# Patient Record
Sex: MALE | Race: BLACK OR AFRICAN AMERICAN | Employment: OTHER | URBAN - METROPOLITAN AREA
[De-identification: names, ages, dates, MRNs, and addresses within clinical notes are randomized per-mention and may not be internally consistent; named-entity substitution may affect disease eponyms.]

---

## 2020-02-22 ENCOUNTER — APPOINTMENT (OUTPATIENT)
Dept: CT IMAGING | Age: 35
End: 2020-02-22
Attending: EMERGENCY MEDICINE
Payer: SELF-PAY

## 2020-02-22 ENCOUNTER — HOSPITAL ENCOUNTER (EMERGENCY)
Age: 35
Discharge: HOME | End: 2020-02-22
Attending: EMERGENCY MEDICINE
Payer: SELF-PAY

## 2020-02-22 VITALS
WEIGHT: 184.08 LBS | RESPIRATION RATE: 16 BRPM | BODY MASS INDEX: 27.27 KG/M2 | TEMPERATURE: 98.4 F | OXYGEN SATURATION: 96 % | DIASTOLIC BLOOD PRESSURE: 54 MMHG | SYSTOLIC BLOOD PRESSURE: 91 MMHG | HEIGHT: 69 IN | HEART RATE: 54 BPM

## 2020-02-22 DIAGNOSIS — R10.31 RLQ ABDOMINAL PAIN: Primary | ICD-10-CM

## 2020-02-22 LAB
ALBUMIN SERPL-MCNC: 4 G/DL (ref 3.5–5)
ALBUMIN/GLOB SERPL: 1 {RATIO} (ref 1.1–2.2)
ALP SERPL-CCNC: 78 U/L (ref 45–117)
ALT SERPL-CCNC: 23 U/L (ref 12–78)
ANION GAP SERPL CALC-SCNC: 5 MMOL/L (ref 5–15)
APPEARANCE UR: CLEAR
AST SERPL-CCNC: 15 U/L (ref 15–37)
BILIRUB SERPL-MCNC: 1.1 MG/DL (ref 0.2–1)
BILIRUB UR QL: NEGATIVE
BUN SERPL-MCNC: 9 MG/DL (ref 6–20)
BUN/CREAT SERPL: 8 (ref 12–20)
CALCIUM SERPL-MCNC: 8.9 MG/DL (ref 8.5–10.1)
CHLORIDE SERPL-SCNC: 106 MMOL/L (ref 97–108)
CO2 SERPL-SCNC: 29 MMOL/L (ref 21–32)
COLOR UR: NORMAL
CREAT SERPL-MCNC: 1.15 MG/DL (ref 0.7–1.3)
ERYTHROCYTE [DISTWIDTH] IN BLOOD BY AUTOMATED COUNT: 12.8 % (ref 11.5–14.5)
GLOBULIN SER CALC-MCNC: 4.1 G/DL (ref 2–4)
GLUCOSE SERPL-MCNC: 100 MG/DL (ref 65–100)
GLUCOSE UR STRIP.AUTO-MCNC: NEGATIVE MG/DL
HCT VFR BLD AUTO: 45.4 % (ref 36.6–50.3)
HGB BLD-MCNC: 15.4 G/DL (ref 12.1–17)
HGB UR QL STRIP: NEGATIVE
KETONES UR QL STRIP.AUTO: NEGATIVE MG/DL
LEUKOCYTE ESTERASE UR QL STRIP.AUTO: NEGATIVE
LIPASE SERPL-CCNC: 119 U/L (ref 73–393)
MCH RBC QN AUTO: 31.5 PG (ref 26–34)
MCHC RBC AUTO-ENTMCNC: 33.9 G/DL (ref 30–36.5)
MCV RBC AUTO: 92.8 FL (ref 80–99)
NITRITE UR QL STRIP.AUTO: NEGATIVE
NRBC # BLD: 0 K/UL (ref 0–0.01)
NRBC BLD-RTO: 0 PER 100 WBC
PH UR STRIP: 5.5 [PH] (ref 5–8)
PLATELET # BLD AUTO: 178 K/UL (ref 150–400)
PMV BLD AUTO: 11.6 FL (ref 8.9–12.9)
POTASSIUM SERPL-SCNC: 3.8 MMOL/L (ref 3.5–5.1)
PROT SERPL-MCNC: 8.1 G/DL (ref 6.4–8.2)
PROT UR STRIP-MCNC: NEGATIVE MG/DL
RBC # BLD AUTO: 4.89 M/UL (ref 4.1–5.7)
SODIUM SERPL-SCNC: 140 MMOL/L (ref 136–145)
SP GR UR REFRACTOMETRY: 1.03 (ref 1–1.03)
UROBILINOGEN UR QL STRIP.AUTO: 1 EU/DL (ref 0.2–1)
WBC # BLD AUTO: 6.9 K/UL (ref 4.1–11.1)

## 2020-02-22 PROCEDURE — 74011000250 HC RX REV CODE- 250: Performed by: EMERGENCY MEDICINE

## 2020-02-22 PROCEDURE — 96372 THER/PROPH/DIAG INJ SC/IM: CPT

## 2020-02-22 PROCEDURE — 85027 COMPLETE CBC AUTOMATED: CPT

## 2020-02-22 PROCEDURE — 80053 COMPREHEN METABOLIC PANEL: CPT

## 2020-02-22 PROCEDURE — 74011250636 HC RX REV CODE- 250/636: Performed by: EMERGENCY MEDICINE

## 2020-02-22 PROCEDURE — 81003 URINALYSIS AUTO W/O SCOPE: CPT

## 2020-02-22 PROCEDURE — 74011636320 HC RX REV CODE- 636/320: Performed by: EMERGENCY MEDICINE

## 2020-02-22 PROCEDURE — 36415 COLL VENOUS BLD VENIPUNCTURE: CPT

## 2020-02-22 PROCEDURE — 74176 CT ABD & PELVIS W/O CONTRAST: CPT

## 2020-02-22 PROCEDURE — 74011250637 HC RX REV CODE- 250/637: Performed by: EMERGENCY MEDICINE

## 2020-02-22 PROCEDURE — 83690 ASSAY OF LIPASE: CPT

## 2020-02-22 PROCEDURE — 96374 THER/PROPH/DIAG INJ IV PUSH: CPT

## 2020-02-22 PROCEDURE — 96375 TX/PRO/DX INJ NEW DRUG ADDON: CPT

## 2020-02-22 PROCEDURE — 99285 EMERGENCY DEPT VISIT HI MDM: CPT

## 2020-02-22 RX ORDER — ONDANSETRON 2 MG/ML
4 INJECTION INTRAMUSCULAR; INTRAVENOUS
Status: COMPLETED | OUTPATIENT
Start: 2020-02-22 | End: 2020-02-22

## 2020-02-22 RX ORDER — KETOROLAC TROMETHAMINE 30 MG/ML
30 INJECTION, SOLUTION INTRAMUSCULAR; INTRAVENOUS
Status: COMPLETED | OUTPATIENT
Start: 2020-02-22 | End: 2020-02-22

## 2020-02-22 RX ORDER — OXYCODONE HYDROCHLORIDE 5 MG/1
5 TABLET ORAL
Status: COMPLETED | OUTPATIENT
Start: 2020-02-22 | End: 2020-02-22

## 2020-02-22 RX ORDER — IBUPROFEN 600 MG/1
600 TABLET ORAL
Qty: 20 TAB | Refills: 0 | Status: SHIPPED | OUTPATIENT
Start: 2020-02-22

## 2020-02-22 RX ORDER — MORPHINE SULFATE 4 MG/ML
4 INJECTION INTRAVENOUS ONCE
Status: COMPLETED | OUTPATIENT
Start: 2020-02-22 | End: 2020-02-22

## 2020-02-22 RX ORDER — ACETAMINOPHEN 500 MG
1000 TABLET ORAL ONCE
Status: COMPLETED | OUTPATIENT
Start: 2020-02-22 | End: 2020-02-22

## 2020-02-22 RX ORDER — LIDOCAINE 4 G/100G
1 PATCH TOPICAL EVERY 24 HOURS
Status: DISCONTINUED | OUTPATIENT
Start: 2020-02-22 | End: 2020-02-22 | Stop reason: HOSPADM

## 2020-02-22 RX ORDER — CYCLOBENZAPRINE HCL 5 MG
5 TABLET ORAL
Qty: 10 TAB | Refills: 0 | Status: SHIPPED | OUTPATIENT
Start: 2020-02-22

## 2020-02-22 RX ADMIN — SODIUM CHLORIDE 1000 ML: 900 INJECTION, SOLUTION INTRAVENOUS at 10:29

## 2020-02-22 RX ADMIN — OXYCODONE 5 MG: 5 TABLET ORAL at 13:16

## 2020-02-22 RX ADMIN — MORPHINE SULFATE 4 MG: 4 INJECTION, SOLUTION INTRAMUSCULAR; INTRAVENOUS at 10:33

## 2020-02-22 RX ADMIN — ACETAMINOPHEN 1000 MG: 500 TABLET ORAL at 13:16

## 2020-02-22 RX ADMIN — ONDANSETRON 4 MG: 2 INJECTION INTRAMUSCULAR; INTRAVENOUS at 10:30

## 2020-02-22 RX ADMIN — KETOROLAC TROMETHAMINE 30 MG: 30 INJECTION, SOLUTION INTRAMUSCULAR at 13:18

## 2020-02-22 RX ADMIN — IOHEXOL 50 ML: 240 INJECTION, SOLUTION INTRATHECAL; INTRAVASCULAR; INTRAVENOUS; ORAL at 10:29

## 2020-02-22 NOTE — ED TRIAGE NOTES
Assumed care of pt via EMS stretcher. Pt is A&O x 4 and reports CC of RLQ pain. Pt states pain has been going on x 3 days as well as NV. Pt reports low grade fever started about a day and a half ago. Called the [place] and referred to ED for rule out appendicitis. Pt placed on monitor x 3. VSS. Will continue to monitor.

## 2020-02-22 NOTE — DISCHARGE INSTRUCTIONS
You were evaluated in the emergency department for right lower quadrant abdominal pain. Your examination was reassuring as was your work-up including blood work, CT scan, and urinalysis. It will be important for you to follow-up with your primary care physician in 3-7 days. If you develop worsening symptoms such as fevers or worsening abdominal pain or testicular pain, please return to the emergency department immediately.     Local Primary Care Physicians  [facility] [phone]  MD Aaliyah Richmond MD Fredy Prost, MD [facility] [phone]  Anastacia Bailey, P  MD Chela iRch, MD Rashi King [place] [address] [phone]  Estill [place]head[place] [address] [facility] [phone]  MD Lio Medina, MD Ramu Russell MD   [facility][1] [phone]  ROD TREVIÑO, MD Paulette Parks, NP [address] [phone]  MD Maurilio Troy, MD Lashonda Loya MD Vania Buzzard, MD Donnalee Clipper, MD Murray Velez MD   [address] [facility]  Jeanine Schneider MD [address] [place] [facility] [phone]  Adella Posey, MD Precilla Dandy, MD Desmond Cardenas Qudm, MD Antony Lopez MD Marlea Mo, MD   [address]7 [facility] [phone]  Mahsa Lewis, MD Paulette Mcburney, [facility]  Page Ano, JOAN Collins, MD Shu Martines, MD Romain Villegas MD [facility] [phone]  MD Renetta Galvin MD Lucendia Roosevelt, MD Fabio Miller MD   [address] [phone]  MD Barb Perry MD [facility] [phone]  MD Pamela Ingram MD Mina Cunas, MD   [facility] [phone]  Dean Daniels, MD Stafford Colony, MD Leim Butler, MD Elysa Hides, MD Raenette Combes, MD Darylene Deaner, NP  Torito Wick MD 1619 [facility]   [phone]  Carmell Myron, MD Estevan Nageotte, MD Alphonza Revere, MD   2102 [facility] [phone]  MD Elizabeth Priest, WILFRID Nava, SILVESTRE Nava, WILFRID Callejas, MD Megan Mcclendon, JOAN Ozuna,              Miscellaneous:  Wily Monterroso MD [facility]     For adult and child immunizations, family planning, TB screening, STD testing and women's health services. [facility]: [place] [phone]      [address]: [address] [phone]      [address] [place] [address]          [address] [place] [address]     For primary care services, woman and child wellness, and some clinics providing specialty care. [facility] -- 1011 [facility]. [facility]5 [facility] [phone] 411 [facility] 200 [facility] [address] [place] [address] [phone]   [address] [facility] [address]  [phone] [address]  [employer] [address] [place] [address] 4550  Community  [phone]   [facility] [address] [address] [place] [phone]   [facility] [address] [facility] [phone]   [facility] 1051 [facility] [phone]   [facility]: [facility] makenzie Cisse [address] [facility] [address] [phone]     [address] [phone]   [facility] 5850 [facility]  [phone]   [employer]  [address], [address] ([email or web address]) [phone]         Sexual Health/Woman Wellness Clinics    For STD/HIV testing and treatment, pregnancy testing and services, men's health, birth control services, LGBT services, and hepatitis/HPV vaccine services. [facility] [employer] [facility] for [employer] 201 [facility] 75 [facility] 157 [address] [phone]   [facility] [address] , 5th floor [phone]   Pregnancy 3928 [facility] [address] [facility] Women [address]  [facility] [phone]         [place] [address]50 [facility] [address] [place] [address]   [phone]   [place]   [phone]   Women, Infant and Children's Services: [place]  [phone]       [address] [place] [address]   [phone]   [address]   0774 [facility]iat[facility]     [phone]   [address] Crisis   1212 [facility] [place] [phone]

## 2020-02-22 NOTE — ED NOTES
Pt discharged by Dr. Aviva Mata. Pt provided with discharge instructions Rx and instructions on follow up care. Pt out of ED ambulatory accompanied by wife.

## 2020-02-22 NOTE — ED PROVIDER NOTES
EMERGENCY DEPARTMENT HISTORY AND PHYSICAL EXAM      Date: 2/22/2020  Patient Name: Nusrat Duffy    History of Presenting Illness     Chief Complaint   Patient presents with    Abdominal Pain     RLQ x 3 day NV also x 3 days and low grade fever x 1 day       History Provided By: Patient    HPI: Nusrat Duffy, 36 y.o. male presents to the ED with cc of right lower quadrant abdominal pain, nausea, vomiting, and fever of 101 °F.  Right lower quadrant pain is been present for the past 3 days. Is associated with a few episodes of nausea and nonbloody nonbilious emesis. Denies any periumbilical or epigastric abdominal pain. Denies any change to bowel habits or urinary symptoms. Denies any flank pain, chest pain, shortness of breath. He did have fever 101 °F but this broke yesterday. He has overall still been able to tolerate normal p.o. intake. No prior history of appendectomy. There are no other complaints, changes, or physical findings at this time. PCP: None    No current facility-administered medications on file prior to encounter. No current outpatient medications on file prior to encounter. Past History     Past Medical History:  No past medical history on file. Past Surgical History:  No past surgical history on file. Family History:  No family history on file. Social History:  Social History     Tobacco Use    Smoking status: Not on file   Substance Use Topics    Alcohol use: Not on file    Drug use: Not on file       Allergies: Allergies   Allergen Reactions    Contrast Agent [Iodine] Hives         Review of Systems   Review of Systems   Constitutional: Positive for fever. Negative for chills. HENT: Negative. Eyes: Negative for visual disturbance. Respiratory: Negative for cough and shortness of breath. Cardiovascular: Negative for chest pain and leg swelling. Gastrointestinal: Positive for abdominal pain, nausea and vomiting. Genitourinary: Negative. Musculoskeletal: Negative for back pain and gait problem. Skin: Negative for color change and rash. Neurological: Negative for dizziness, weakness, light-headedness and headaches. Hematological: Does not bruise/bleed easily. All other systems reviewed and are negative. Physical Exam   Physical Exam  Vitals signs and nursing note reviewed. Constitutional:       General: He is not in acute distress. Appearance: Normal appearance. He is not ill-appearing or toxic-appearing. HENT:      Head: Normocephalic and atraumatic. Nose: Nose normal.      Mouth/Throat:      Mouth: Mucous membranes are moist.   Eyes:      Extraocular Movements: Extraocular movements intact. Pupils: Pupils are equal, round, and reactive to light. Neck:      Musculoskeletal: Normal range of motion and neck supple. Cardiovascular:      Rate and Rhythm: Normal rate and regular rhythm. Heart sounds: No murmur. Pulmonary:      Effort: Pulmonary effort is normal. No respiratory distress. Breath sounds: Normal breath sounds. No wheezing. Abdominal:      General: There is no distension. Palpations: Abdomen is soft. Tenderness: There is abdominal tenderness in the right lower quadrant. There is rebound. There is no guarding. Positive signs include McBurney's sign. Hernia: No hernia is present. Genitourinary:     Penis: Normal.       Scrotum/Testes: Normal.         Right: Tenderness or swelling not present. Left: Tenderness or swelling not present. Musculoskeletal: Normal range of motion. General: No swelling or tenderness. Right lower leg: No edema. Left lower leg: No edema. Skin:     General: Skin is warm and dry. Coloration: Skin is not pale. Findings: No erythema. Neurological:      General: No focal deficit present. Mental Status: He is alert and oriented to person, place, and time.          Diagnostic Study Results     Labs -     Recent Results (from the past 12 hour(s))   CBC W/O DIFF    Collection Time: 02/22/20 10:01 AM   Result Value Ref Range    WBC 6.9 4.1 - 11.1 K/uL    RBC 4.89 4. 10 - 5.70 M/uL    HGB 15.4 12.1 - 17.0 g/dL    HCT 45.4 36.6 - 50.3 %    MCV 92.8 80.0 - 99.0 FL    MCH 31.5 26.0 - 34.0 PG    MCHC 33.9 30.0 - 36.5 g/dL    RDW 12.8 11.5 - 14.5 %    PLATELET 540 800 - 286 K/uL    MPV 11.6 8.9 - 12.9 FL    NRBC 0.0 0  WBC    ABSOLUTE NRBC 0.00 0.00 - 2.12 K/uL   METABOLIC PANEL, COMPREHENSIVE    Collection Time: 02/22/20 10:01 AM   Result Value Ref Range    Sodium 140 136 - 145 mmol/L    Potassium 3.8 3.5 - 5.1 mmol/L    Chloride 106 97 - 108 mmol/L    CO2 29 21 - 32 mmol/L    Anion gap 5 5 - 15 mmol/L    Glucose 100 65 - 100 mg/dL    BUN 9 6 - 20 MG/DL    Creatinine 1.15 0.70 - 1.30 MG/DL    BUN/Creatinine ratio 8 (L) 12 - 20      GFR est AA >60 >60 ml/min/1.73m2    GFR est non-AA >60 >60 ml/min/1.73m2    Calcium 8.9 8.5 - 10.1 MG/DL    Bilirubin, total 1.1 (H) 0.2 - 1.0 MG/DL    ALT (SGPT) 23 12 - 78 U/L    AST (SGOT) 15 15 - 37 U/L    Alk. phosphatase 78 45 - 117 U/L    Protein, total 8.1 6.4 - 8.2 g/dL    Albumin 4.0 3.5 - 5.0 g/dL    Globulin 4.1 (H) 2.0 - 4.0 g/dL    A-G Ratio 1.0 (L) 1.1 - 2.2     LIPASE    Collection Time: 02/22/20 10:01 AM   Result Value Ref Range    Lipase 119 73 - 393 U/L   URINALYSIS W/ RFLX MICROSCOPIC    Collection Time: 02/22/20 11:06 AM   Result Value Ref Range    Color YELLOW/STRAW      Appearance CLEAR CLEAR      Specific gravity 1.027 1.003 - 1.030      pH (UA) 5.5 5.0 - 8.0      Protein NEGATIVE  NEG mg/dL    Glucose NEGATIVE  NEG mg/dL    Ketone NEGATIVE  NEG mg/dL    Bilirubin NEGATIVE  NEG      Blood NEGATIVE  NEG      Urobilinogen 1.0 0.2 - 1.0 EU/dL    Nitrites NEGATIVE  NEG      Leukocyte Esterase NEGATIVE  NEG         Radiologic Studies -   CT ABD PELV WO CONT   Final Result   IMPRESSION:   No acute intra-abdominal pathology. No evidence of appendicitis.         CT Results  (Last 48 hours)               02/22/20 1145  CT ABD PELV WO CONT Final result    Impression:  IMPRESSION:   No acute intra-abdominal pathology. No evidence of appendicitis. Narrative:  EXAM: CT ABD PELV WO CONT       INDICATION: RLQ r/o appy       COMPARISON: No comparisons       CONTRAST:  None. TECHNIQUE:    Thin axial images were obtained through the abdomen and pelvis. Coronal and   sagittal reconstructions were generated. Oral contrast was administered. CT dose   reduction was achieved through use of a standardized protocol tailored for this   examination and automatic exposure control for dose modulation. The absence of intravenous contrast material reduces the sensitivity for   evaluation of the solid parenchymal organs of the abdomen. FINDINGS:    LUNG BASES: Clear. INCIDENTALLY IMAGED HEART AND MEDIASTINUM: Unremarkable. LIVER: No mass or biliary dilatation. GALLBLADDER: Unremarkable. SPLEEN: No mass. PANCREAS: No mass or ductal dilatation. ADRENALS: Unremarkable. KIDNEYS/URETERS: No mass, calculus, or hydronephrosis. STOMACH: Unremarkable. SMALL BOWEL: No dilatation or wall thickening. COLON: No dilatation or wall thickening. APPENDIX: Unremarkable. PERITONEUM: No ascites or pneumoperitoneum. RETROPERITONEUM: No lymphadenopathy or aortic aneurysm. REPRODUCTIVE ORGANS: Unremarkable   URINARY BLADDER: No mass or calculus. BONES: No destructive bone lesion. ADDITIONAL COMMENTS: N/A               CXR Results  (Last 48 hours)    None          Medical Decision Making   I am the first provider for this patient. I reviewed the vital signs, available nursing notes, past medical history, past surgical history, family history and social history. Vital Signs-Reviewed the patient's vital signs.   Patient Vitals for the past 12 hrs:   Temp Pulse Resp BP SpO2   02/22/20 1300  (!) 56 17 111/64    02/22/20 1200  (!) 54 18 120/79 96 %   02/22/20 1100  (!) 57 14 112/63    02/22/20 1000    122/81 (!) 81 %   02/22/20 0959     99 %   02/22/20 0956 98.4 °F (36.9 °C) 63 16 122/81 99 %   02/22/20 0953 98.4 °F (36.9 °C)  16 119/69      Records Reviewed: Nursing Notes, Old Medical Records, Previous Radiology Studies and Previous Laboratory Studies    Provider Notes (Medical Decision Making): This is a 71-year-old male here with right lower quadrant abdominal pain. On examination he is in no acute distress. He is afebrile vital sign stable through entire ED stay. I did have high concern for appendicitis and CT scan was unremarkable. He is afebrile and there is no significant leukocytosis. His pain is improved in the ED with administration of morphine, Toradol, Tylenol, lidocaine patch. His symptoms may be musculoskeletal related as he does have considerable tenderness to palpation overlying the musculature over the right side of his abdomen. He is feeling much improved at this time and I feel he is stable for discharge home. He was given strict return ED precautions and encouraged to follow-up and given PCP referral.  All questions answered and discharged home in stable condition. ED Course:   Initial assessment performed. The patients presenting problems have been discussed, and they are in agreement with the care plan formulated and outlined with them. I have encouraged them to ask questions as they arise throughout their visit. Discharge Note:  The patient has been re-evaluated and is ready for discharge. Reviewed available results with patient. Counseled patient on diagnosis and care plan. Patient has expressed understanding, and all questions have been answered. Patient agrees with plan and agrees to follow up as recommended, or to return to the ED if their symptoms worsen. Discharge instructions have been provided and explained to the patient, along with reasons to return to the ED. Critical Care Time:   0    Disposition:  Home    PLAN:  1. Current Discharge Medication List      START taking these medications    Details   ibuprofen (MOTRIN) 600 mg tablet Take 1 Tab by mouth every six (6) hours as needed for Pain. Qty: 20 Tab, Refills: 0      cyclobenzaprine (FLEXERIL) 5 mg tablet Take 1 Tab by mouth three (3) times daily as needed for Muscle Spasm(s). Qty: 10 Tab, Refills: 0           2. Follow-up Information     Follow up With Specialties Details Why Contact Info    [facility] EMERGENCY DEPT Emergency Medicine Go to  As needed, If symptoms worsen [address] [place] [address]  [phone]    [address] [place] [address] Internal Medicine Schedule an appointment as soon as possible for a visit  to establish with a PCP [facility]  [facility] Check I'm Here  [phone]        Return to ED if worse     Diagnosis     Clinical Impression:   1. RLQ abdominal pain        Attestations:    Rosibel Rocha MD    Please note that this dictation was completed with [employer], the computer voice recognition software. Quite often unanticipated grammatical, syntax, homophones, and other interpretive errors are inadvertently transcribed by the computer software. Please disregard these errors. Please excuse any errors that have escaped final proofreading. Thank you.